# Patient Record
Sex: FEMALE | Race: WHITE | NOT HISPANIC OR LATINO | URBAN - METROPOLITAN AREA
[De-identification: names, ages, dates, MRNs, and addresses within clinical notes are randomized per-mention and may not be internally consistent; named-entity substitution may affect disease eponyms.]

---

## 2020-01-23 ENCOUNTER — APPOINTMENT (RX ONLY)
Dept: URBAN - METROPOLITAN AREA CLINIC 110 | Facility: CLINIC | Age: 40
Setting detail: DERMATOLOGY
End: 2020-01-23

## 2020-01-23 DIAGNOSIS — D485 NEOPLASM OF UNCERTAIN BEHAVIOR OF SKIN: ICD-10-CM

## 2020-01-23 PROBLEM — D48.5 NEOPLASM OF UNCERTAIN BEHAVIOR OF SKIN: Status: ACTIVE | Noted: 2020-01-23

## 2020-01-23 PROCEDURE — 99202 OFFICE O/P NEW SF 15 MIN: CPT

## 2020-01-23 PROCEDURE — ? FULL BODY SKIN EXAM - DECLINED

## 2020-01-23 PROCEDURE — ? COUNSELING

## 2020-01-23 PROCEDURE — ? DEFER

## 2020-01-23 PROCEDURE — ? ADDITIONAL NOTES

## 2020-01-23 ASSESSMENT — LOCATION SIMPLE DESCRIPTION DERM: LOCATION SIMPLE: LEFT CHEEK

## 2020-01-23 ASSESSMENT — LOCATION DETAILED DESCRIPTION DERM: LOCATION DETAILED: LEFT INFERIOR MEDIAL MALAR CHEEK

## 2020-01-23 ASSESSMENT — LOCATION ZONE DERM: LOCATION ZONE: FACE

## 2020-01-23 NOTE — PROCEDURE: MIPS QUALITY
Quality 402: Tobacco Use And Help With Quitting Among Adolescents: Patient screened for tobacco and is an ex-smoker
Quality 431: Preventive Care And Screening: Unhealthy Alcohol Use - Screening: Patient screened for unhealthy alcohol use using a single question and scores less than 2 times per year
Quality 110: Preventive Care And Screening: Influenza Immunization: Influenza Immunization Administered during Influenza season
Detail Level: Detailed
Quality 130: Documentation Of Current Medications In The Medical Record: Current Medications Documented
Quality 226: Preventive Care And Screening: Tobacco Use: Screening And Cessation Intervention: Patient screened for tobacco use and is an ex/non-smoker
Name And Contact Information For Health Care Proxy: Isaias
Quality 47: Advance Care Plan: Advance Care Planning discussed and documented; advance care plan or surrogate decision maker documented in the medical record.

## 2020-01-23 NOTE — HPI: FULL BODY SKIN EXAMINATION
What Is The Reason For Today's Visit?: Full Body Skin Examination
What Is The Reason For Today's Visit? (Being Monitored For X): concerning skin lesions on an annual basis
How Severe Are Your Spot(S)?: moderate
Additional History: Patient has a growth on her left corner of lif she would like removed.

## 2020-01-23 NOTE — PROCEDURE: DEFER
Introduction Text (Please End With A Colon): Shave biopsy.
Detail Level: Detailed
Procedure To Be Performed At Next Visit: Biopsy by shave method